# Patient Record
Sex: MALE | Race: WHITE | NOT HISPANIC OR LATINO | Employment: FULL TIME | URBAN - METROPOLITAN AREA
[De-identification: names, ages, dates, MRNs, and addresses within clinical notes are randomized per-mention and may not be internally consistent; named-entity substitution may affect disease eponyms.]

---
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
## 2018-08-14 ENCOUNTER — TELEPHONE (OUTPATIENT)
Dept: FAMILY MEDICINE | Facility: CLINIC | Age: 18
End: 2018-08-14

## 2018-08-14 ENCOUNTER — OFFICE VISIT (OUTPATIENT)
Dept: FAMILY MEDICINE | Facility: CLINIC | Age: 18
End: 2018-08-14
Payer: COMMERCIAL

## 2018-08-14 VITALS
DIASTOLIC BLOOD PRESSURE: 68 MMHG | HEART RATE: 75 BPM | SYSTOLIC BLOOD PRESSURE: 114 MMHG | WEIGHT: 158 LBS | TEMPERATURE: 97.6 F | BODY MASS INDEX: 21.4 KG/M2 | HEIGHT: 72 IN

## 2018-08-14 DIAGNOSIS — M54.59 MECHANICAL LOW BACK PAIN: Primary | ICD-10-CM

## 2018-08-14 PROCEDURE — 99213 OFFICE O/P EST LOW 20 MIN: CPT | Performed by: PHYSICIAN ASSISTANT

## 2018-08-14 ASSESSMENT — PAIN SCALES - GENERAL: PAINLEVEL: MILD PAIN (3)

## 2018-08-14 NOTE — TELEPHONE ENCOUNTER
Patient was seen by Ron today and referred to Chiropractor. Chiropractor office asking for xray report from last year be sent to them. Please call mom back and she will have info on where to send report to.

## 2018-08-14 NOTE — MR AVS SNAPSHOT
After Visit Summary   8/14/2018    Chris Bartholomew    MRN: [id number]           Patient Information     Date Of Birth          2000        Visit Information        Provider Department      8/14/2018 8:20 AM Ron Richardson PA-C [facility]        Today's Diagnoses     Mechanical low back pain    -  1       Follow-ups after your visit        Additional Services     CHIROPRACTIC REFERRAL       Your provider has referred you to: [facility]ropractor   Brian Guallpa. [phone]    Please be aware that coverage of these services is subject to the terms and limitations of your health insurance plan.  Call member services at your health plan with any benefit or coverage questions.      Please bring the following to your appointment:    >>   Any x-rays, CTs or MRIs which have been performed.  Contact the facility where they were done to arrange for  prior to your scheduled appointment.    >>   List of current medications   >>   This referral request   >>   Any documents/labs given to you for this referral                  Your next 10 appointments already scheduled     Aug 23, 2018  8:00 AM CDT   [facility]    [address] [facility] [address] [phone]              Who to contact     Normal or non-critical lab and imaging results will be communicated to you by MyChart, letter or phone within 4 business days after the clinic has received the results. If you do not hear from us within 7 days, please contact the clinic through MyChart or phone. If you have a critical or abnormal lab result, we will notify you by phone as soon as possible.  Submit refill requests through [employer] or call your pharmacy and they will forward the refill request to us. Please allow 3 business days for your refill to be completed.          If you need to speak with a  for additional information ,  "please call: [phone]             Additional Information About Your Visit        [employer] Information     [employer] lets you send messages to your doctor, view your test results, renew your prescriptions, schedule appointments and more. To sign up, go to [email or web address] . Click on \"Log in\" on the left side of the screen, which will take you to the Welcome page. Then click on \"Sign up Now\" on the right side of the page.     You will be asked to enter the access code listed below, as well as some personal information. Please follow the directions to create your username and password.     Your access code is: [email or web address]  Expires: 2018  8:59 AM     Your access code will  in 90 days. If you need help or a new code, please call your [facility] clinic or [phone].        Care EveryWhere ID     This is your Care EveryWhere ID. This could be used by other organizations to access your [facility] medical records  [id number]        Your Vitals Were     Pulse Temperature Height BMI (Body Mass Index)          75 97.6  F (36.4  C) (Tympanic) 5' 11.5\" (1.816 m) 21.73 kg/m2         Blood Pressure from Last 3 Encounters:   18 114/68   17 133/82   12 153/84    Weight from Last 3 Encounters:   18 158 lb (71.7 kg)   17 180 lb (81.6 kg) (83 %)*   12 157 lb 6.4 oz (71.4 kg) (91 %)*     * Growth percentiles are based on CDC 2-20 Years data.              We Performed the Following     CHIROPRACTIC REFERRAL        Primary Care Provider Office Phone # Fax #    Ron Richardson PA-C [phone]       [address] [place] [address]        Equal Access to Services     [facility]STEL : Constantine warren Soalyssa, waaxda luqadaha, qaybta kaalemeterio cullen, musa hummel. So [facility] [phone].    ATENCIÓN: Si habla español, tiene a gallegos disposición servicios gratuitos de asistencia lingüística. Llame al [phone].    We comply with " applicable federal civil rights laws and [place] laws. We do not discriminate on the basis of race, color, national origin, age, disability, sex, sexual orientation, or gender identity.            Thank you!     Thank you for choosing [facility]  for your care. Our goal is always to provide you with excellent care. Hearing back from our patients is one way we can continue to improve our services. Please take a few minutes to complete the written survey that you may receive in the mail after your visit with us. Thank you!             Your Updated Medication List - Protect others around you: Learn how to safely use, store and throw away your medicines at www.disposemymeds.org.          This list is accurate as of 8/14/18  9:00 AM.  Always use your most recent med list.                   Brand Name Dispense Instructions for use Diagnosis    albuterol (2.5 MG/3ML) 0.083% neb solution     30 vial    Take 3 mLs by nebulization every 6 hours as needed for shortness of breath / dyspnea.    Mild persistent asthma with exacerbation

## 2018-08-14 NOTE — PROGRESS NOTES
SUBJECTIVE:   Chris Bartholomew is a 18 year old male who presents to clinic today for the following health issues:    Chief Complaint   Patient presents with     Back Pain     **He is still having ongoing back pain since his last visit in June. He says it is getting a little bit better but is still there, and not going away.     **He was doing Physical Therapy regularly, and now he has been doing it inconsistently.      No radicular pain or paresthesias.     Problem list and histories reviewed & adjusted, as indicated.  Additional history: as documented    Patient Active Problem List   Diagnosis     Mild persistent asthma     History reviewed. No pertinent surgical history.    Social History   Substance Use Topics     Smoking status: Never Smoker     Smokeless tobacco: Never Used      Comment: Lives in smoke free household     Alcohol use No     Family History   Problem Relation Age of Onset     Unknown/Adopted Brother      half brother     Unknown/Adopted Brother      half brother     Allergies Mother      Parkinsonism Mother 54     Prostate Cancer Father      Cancer Father      prostate     Allergies Maternal Grandmother      Arthritis Maternal Grandmother      Gynecology Maternal Grandmother      Parkinsonism Maternal Grandmother      PSP     Allergies Maternal Grandfather      Cancer Maternal Grandfather      throat     Cancer - colorectal Paternal Grandmother      Respiratory Paternal Grandfather      Allergies Brother      Glaucoma Other      Hypertension Other      Diabetes Other      Glaucoma Other      Parkinsonism Other      Parkinsonism Other      Macular Degeneration No family hx of      Cerebrovascular Disease No family hx of      Thyroid Disease No family hx of          Current Outpatient Prescriptions   Medication Sig Dispense Refill     albuterol (2.5 MG/3ML) 0.083% nebulizer solution Take 3 mLs by nebulization every 6 hours as needed for shortness of breath / dyspnea. (Patient not taking: Reported  on 8/14/2018) 30 vial 0     Allergies   Allergen Reactions     Amoxicillin      Septra [Bactrim]      No lab results found.   BP Readings from Last 3 Encounters:   08/14/18 114/68   06/27/17 133/82   12/09/12 153/84    Wt Readings from Last 3 Encounters:   08/14/18 158 lb (71.7 kg)   06/27/17 180 lb (81.6 kg) (83 %)*   12/09/12 157 lb 6.4 oz (71.4 kg) (91 %)*     * Growth percentiles are based on CDC 2-20 Years data.                  Labs reviewed in EPIC    Reviewed and updated as needed this visit by clinical staff  Tobacco  Allergies  Meds  Med Hx  Surg Hx  Fam Hx  Soc Hx      Reviewed and updated as needed this visit by Provider         All other systems negative except as outline above  OBJECTIVE:  BACK: Lumbosacral spine area reveals local tenderness.  Painful and reduced LS ROM noted. Straight leg raise is negative.  DTR's, motor strength and sensation normal, including heel and toe gait.  Perifpheral pulses are palpable.  Hipes and knees have full range of motion without pain.  No abdominal tenderness, mass or organomegaly.    Chris was seen today for back pain.    Diagnoses and all orders for this visit:    Mechanical low back pain  -     CHIROPRACTIC REFERRAL      Advised supportive and symptomatic treatment.  Follow up with Provider - if condition persists or worsens.

## 2018-08-23 ENCOUNTER — OFFICE VISIT (OUTPATIENT)
Dept: OPTOMETRY | Facility: CLINIC | Age: 18
End: 2018-08-23
Payer: COMMERCIAL

## 2018-08-23 DIAGNOSIS — H52.13 MYOPIA OF BOTH EYES: Primary | ICD-10-CM

## 2018-08-23 PROCEDURE — 92014 COMPRE OPH EXAM EST PT 1/>: CPT | Performed by: OPTOMETRIST

## 2018-08-23 PROCEDURE — 92015 DETERMINE REFRACTIVE STATE: CPT | Performed by: OPTOMETRIST

## 2018-08-23 ASSESSMENT — REFRACTION_MANIFEST
OS_CYLINDER: +0.25
METHOD_AUTOREFRACTION: 1
OS_SPHERE: -4.50
OD_SPHERE: -4.25
OD_CYLINDER: SPHERE
OS_SPHERE: -4.50
OS_AXIS: 088
OD_SPHERE: -4.00
OS_CYLINDER: SPHERE

## 2018-08-23 ASSESSMENT — VISUAL ACUITY
METHOD: SNELLEN - LINEAR
OS_CC: 20/20
OD_CC: 20/20
CORRECTION_TYPE: GLASSES
OD_CC: 20/20
OS_CC: 20/20

## 2018-08-23 ASSESSMENT — REFRACTION_WEARINGRX
SPECS_TYPE: SVL
OD_SPHERE: -4.50
OS_CYLINDER: SPHERE
OD_CYLINDER: SPHERE
OS_SPHERE: -4.50

## 2018-08-23 ASSESSMENT — KERATOMETRY
OD_AXISANGLE2_DEGREES: 10
OS_AXISANGLE2_DEGREES: 2
OS_K2POWER_DIOPTERS: 43.50
OD_K1POWER_DIOPTERS: 42.50
OS_K1POWER_DIOPTERS: 42.75
OD_K2POWER_DIOPTERS: 43.25

## 2018-08-23 ASSESSMENT — CONF VISUAL FIELD
OD_NORMAL: 1
OS_NORMAL: 1
METHOD: COUNTING FINGERS

## 2018-08-23 ASSESSMENT — TONOMETRY
OS_IOP_MMHG: 16
IOP_METHOD: APPLANATION
OD_IOP_MMHG: 16

## 2018-08-23 NOTE — PROGRESS NOTES
Chief Complaint   Patient presents with     COMPREHENSIVE EYE EXAM     yearly      Accompanied by mother, also having an exam today   Last Eye Exam: 8/22/17  Dilated Previously: Yes    What are you currently using to see?  Glasses, wears the glasses all of the time        Distance Vision Acuity: Satisfied with vision, no changes     Near Vision Acuity: Satisfied with vision while reading and using computer with glasses    Eye Comfort: good  Do you use eye drops? 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       OBJECTIVE: See Ophthalmology exam    ASSESSMENT:    ICD-10-CM    1. Myopia of both eyes H52.13 EYE EXAM (SIMPLE-NONBILLABLE)     REFRACTION      PLAN:     Patient Instructions   Patient was advised of today's exam findings.  Fill glasses prescription  Return in 1 year for eye exam    Neda Hamilton O.D.  [facility]   [address] [place], [place] [address] [phone]

## 2018-08-23 NOTE — PATIENT INSTRUCTIONS
Patient was advised of today's exam findings.  Fill glasses prescription  Return in 1 year for eye exam    Neda Hamilton O.D.  [facility]   [address] [place], [place] [address] [phone]

## 2018-08-23 NOTE — MR AVS SNAPSHOT
"              After Visit Summary   8/23/2018    Chris Bartholomew    MRN: [id number]           Patient Information     Date Of Birth          2000        Visit Information        Provider Department      8/23/2018 8:00 AM Neda Hamilton, [facility]        Today's Diagnoses     Myopia of both eyes    -  1      Care Instructions    Patient was advised of today's exam findings.  Fill glasses prescription  Return in 1 year for eye exam    Neda Hamilton O.D.  [facility]   [address] [place] [address]  [phone]          Follow-ups after your visit        Who to contact     If you have questions or need follow up information about today's clinic visit or your schedule please contact [facility] directly at [phone].  Normal or non-critical lab and imaging results will be communicated to you by MyChart, letter or phone within 4 business days after the clinic has received the results. If you do not hear from us within 7 days, please contact the clinic through MyChart or phone. If you have a critical or abnormal lab result, we will notify you by phone as soon as possible.  Submit refill requests through [employer] or call your pharmacy and they will forward the refill request to us. Please allow 3 business days for your refill to be completed.          Additional Information About Your Visit        MyChart Information     [employer] lets you send messages to your doctor, view your test results, renew your prescriptions, schedule appointments and more. To sign up, go to [email or web address] . Click on \"Log in\" on the left side of the screen, which will take you to the Welcome page. Then click on \"Sign up Now\" on the right side of the page.     You will be asked to enter the access code listed below, as well as some personal information. Please follow the directions to create your username and password.     Your access code is: [email or web address]  Expires: 11/12/2018  " 9:13 AM     Your access code will  in 90 days. If you need help or a new code, please call your [facility] clinic or [phone].        Care EveryWhere ID     This is your Care EveryWhere ID. This could be used by other organizations to access your [facility] medical records  [id number]         Blood Pressure from Last 3 Encounters:   18 114/68   17 133/82   12 153/84    Weight from Last 3 Encounters:   18 71.7 kg (158 lb)   17 81.6 kg (180 lb) (83 %)*   12 71.4 kg (157 lb 6.4 oz) (91 %)*     * Growth percentiles are based on [facility] 2-20 Years data.              We Performed the Following     EYE EXAM (SIMPLE-NONBILLABLE)     REFRACTION        Primary Care Provider Office Phone # Fax #    Ron Richardson PA-C [phone]       [address]  [place] [address]        Equal Access to Services     [facility]: Hadii aad ku hadasho Soomaali, waaxda luqadaha, qaybta kaalmada adeegyada, waxay andi haythao jain . So [facility] [phone].    ATENCIÓN: Si habla español, tiene a gallegos disposición servicios gratuitos de asistencia lingüística. Llame al [phone].    We comply with applicable federal civil rights laws and [place] laws. We do not discriminate on the basis of race, color, national origin, age, disability, sex, sexual orientation, or gender identity.            Thank you!     Thank you for choosing [facility]  for your care. Our goal is always to provide you with excellent care. Hearing back from our patients is one way we can continue to improve our services. Please take a few minutes to complete the written survey that you may receive in the mail after your visit with us. Thank you!             Your Updated Medication List - Protect others around you: Learn how to safely use, store and throw away your medicines at www.disposemymeds.org.          This list is accurate as of 18  9:31 AM.  Always use your most recent  med list.                   Brand Name Dispense Instructions for use Diagnosis    albuterol (2.5 MG/3ML) 0.083% neb solution     30 vial    Take 3 mLs by nebulization every 6 hours as needed for shortness of breath / dyspnea.    Mild persistent asthma with exacerbation

## 2018-08-23 NOTE — LETTER
8/23/2018         RE: Chris Bartholomew  [id number]  [place] [address]        Dear Colleague,    Thank you for referring your patient, Chris Bartholomew, to [facility]. Please see a copy of my visit note below.    Chief Complaint   Patient presents with     COMPREHENSIVE EYE EXAM     yearly      Accompanied by mother, also having an exam today   Last Eye Exam: 8/22/17  Dilated Previously: Yes    What are you currently using to see?  Glasses, wears the glasses all of the time        Distance Vision Acuity: Satisfied with vision, no changes     Near Vision Acuity: Satisfied with vision while reading and using computer with glasses    Eye Comfort: good  Do you use eye drops? : No  Occupation or Hobbies: Jr in college, -[employer] engineering major    Emily Apple Optometric Assistant           Medical, surgical and family histories reviewed and updated 8/23/2018.       OBJECTIVE: See Ophthalmology exam    ASSESSMENT:    ICD-10-CM    1. Myopia of both eyes H52.13 EYE EXAM (SIMPLE-NONBILLABLE)     REFRACTION      PLAN:     Patient Instructions   Patient was advised of today's exam findings.  Fill glasses prescription  Return in 1 year for eye exam    Neda Hamilton O.D.  [facility]   [address] [place], [place] [id number] [phone]         Again, thank you for allowing me to participate in the care of your patient.        Sincerely,        Neda Hamilton, OD

## 2018-08-25 ASSESSMENT — CUP TO DISC RATIO
OS_RATIO: 0.4
OD_RATIO: 0.4

## 2018-08-25 ASSESSMENT — SLIT LAMP EXAM - LIDS: COMMENTS: NORMAL

## 2018-08-25 ASSESSMENT — EXTERNAL EXAM - LEFT EYE: OS_EXAM: NORMAL

## 2018-08-25 ASSESSMENT — EXTERNAL EXAM - RIGHT EYE: OD_EXAM: NORMAL

## 2019-08-26 ENCOUNTER — OFFICE VISIT (OUTPATIENT)
Dept: OPTOMETRY | Facility: CLINIC | Age: 19
End: 2019-08-26
Payer: COMMERCIAL

## 2019-08-26 DIAGNOSIS — H52.13 MYOPIA OF BOTH EYES: Primary | ICD-10-CM

## 2019-08-26 DIAGNOSIS — H52.223 REGULAR ASTIGMATISM OF BOTH EYES: ICD-10-CM

## 2019-08-26 PROCEDURE — 92015 DETERMINE REFRACTIVE STATE: CPT | Performed by: OPTOMETRIST

## 2019-08-26 PROCEDURE — 92014 COMPRE OPH EXAM EST PT 1/>: CPT | Performed by: OPTOMETRIST

## 2019-08-26 ASSESSMENT — KERATOMETRY
OD_AXISANGLE2_DEGREES: 6
OD_K1POWER_DIOPTERS: 42.50
OS_K2POWER_DIOPTERS: 43.75
OS_AXISANGLE2_DEGREES: 3
OD_K2POWER_DIOPTERS: 43.50
OS_K1POWER_DIOPTERS: 42.75

## 2019-08-26 ASSESSMENT — TONOMETRY
OD_IOP_MMHG: 12
OS_IOP_MMHG: 12
IOP_METHOD: APPLANATION

## 2019-08-26 ASSESSMENT — REFRACTION_MANIFEST
OD_AXIS: 105
OS_SPHERE: -4.50
OD_CYLINDER: +0.25
OS_CYLINDER: +0.50
OD_SPHERE: -4.50
METHOD_AUTOREFRACTION: 1
OD_CYLINDER: +0.50
OS_CYLINDER: +0.50
OD_SPHERE: -4.50
OS_AXIS: 090
OD_AXIS: 107
OS_SPHERE: -4.50
OS_AXIS: 094

## 2019-08-26 ASSESSMENT — REFRACTION_WEARINGRX
OS_CYLINDER: SPHERE
OD_SPHERE: -4.50
SPECS_TYPE: SVL
OD_CYLINDER: SPHERE
OS_SPHERE: -4.50

## 2019-08-26 ASSESSMENT — VISUAL ACUITY
OS_CC: 20/20
OD_CC: 20/20
OS_CC: 20/20
OD_CC: 20/20
CORRECTION_TYPE: GLASSES
METHOD: SNELLEN - LINEAR

## 2019-08-26 ASSESSMENT — EXTERNAL EXAM - LEFT EYE: OS_EXAM: NORMAL

## 2019-08-26 ASSESSMENT — CONF VISUAL FIELD
OD_NORMAL: 1
OS_NORMAL: 1
METHOD: COUNTING FINGERS

## 2019-08-26 ASSESSMENT — CUP TO DISC RATIO
OD_RATIO: 0.4
OS_RATIO: 0.4

## 2019-08-26 ASSESSMENT — EXTERNAL EXAM - RIGHT EYE: OD_EXAM: NORMAL

## 2019-08-26 ASSESSMENT — SLIT LAMP EXAM - LIDS: COMMENTS: NORMAL

## 2019-08-26 NOTE — PATIENT INSTRUCTIONS
Patient was advised of today's exam findings.  Fill glasses prescription  Return in 1 year for eye exam      Neda Hamilton O.D.  [facility]   [address] [place], [place] [id number] [phone]

## 2020-12-20 ENCOUNTER — OFFICE VISIT (OUTPATIENT)
Dept: URGENT CARE | Facility: URGENT CARE | Age: 20
End: 2020-12-20
Payer: COMMERCIAL

## 2020-12-20 VITALS
OXYGEN SATURATION: 100 % | WEIGHT: 170 LBS | DIASTOLIC BLOOD PRESSURE: 80 MMHG | RESPIRATION RATE: 16 BRPM | TEMPERATURE: 98.5 F | BODY MASS INDEX: 23.38 KG/M2 | SYSTOLIC BLOOD PRESSURE: 182 MMHG | HEART RATE: 88 BPM

## 2020-12-20 DIAGNOSIS — M26.621 TMJ TENDERNESS, RIGHT: Primary | ICD-10-CM

## 2020-12-20 PROCEDURE — 99213 OFFICE O/P EST LOW 20 MIN: CPT | Performed by: NURSE PRACTITIONER

## 2020-12-20 NOTE — PROGRESS NOTES
SUBJECTIVE:   Chris Bartholomew is a 20 year old male presenting with a chief complaint of jaw pain right upper side by ear (TMJ joint)  Onset of symptoms was 1 week(s) ago.  Course of illness is worsening.    Severity moderate  Current and Associated symptoms:painful to chew,  locking  Has been clenching at night he believes, increased stress at work. No tooth issues or fever  Treatment measures tried include Tylenol/Ibuprofen.ice heat  Predisposing factors include None.    Past Medical History:   Diagnosis Date     Asthma      Nevus     left sole of foot 2 x4 nevus     Current Outpatient Medications   Medication Sig Dispense Refill     albuterol (2.5 MG/3ML) 0.083% nebulizer solution Take 3 mLs by nebulization every 6 hours as needed for shortness of breath / dyspnea. (Patient not taking: Reported on 8/14/2018) 30 vial 0     Social History     Tobacco Use     Smoking status: Never Smoker     Smokeless tobacco: Never Used     Tobacco comment: Lives in smoke free household   Substance Use Topics     Alcohol use: No       ROS:  Review of systems negative except as stated above.    OBJECTIVE:  BP (!) 182/80   Pulse 88   Temp 98.5  F (36.9  C) (Oral)   Resp 16   Wt 77.1 kg (170 lb)   SpO2 100%   BMI 23.38 kg/m    GENERAL APPEARANCE:  alert and no distress  EYES: EOMI,  PERRL, conjunctiva clear  HENT: ear canals and TM's normal.  Nose and mouth without ulcers, erythema or lesions. Tenderness at right TMJ joint, clicking  NECK: supple, nontender, no lymphadenopathy  RESP: lungs clear to auscultation - no rales, rhonchi or wheezes  CV: regular rates and rhythm, normal S1 S2, no murmur noted  NEURO: Normal strength and tone, sensory exam grossly normal,  normal speech and mentation  SKIN: no suspicious lesions or rashes    ASSESSMENT:  (M26.621) TMJ tenderness, right  (primary encounter diagnosis)    Plan: OTOLARYNGOLOGY REFERRAL  Will do soft diet bland foods ibuprofen ice  Patient education given  Home treat and  monitor symptoms call or rtc as needed      ERIK Grimaldo CNP

## 2020-12-20 NOTE — PATIENT INSTRUCTIONS
Patient Education     Pain Relief Methods for Temporomandibular Disorders (TMD)  You have been diagnosed with temporomandibular disorder (TMD). This term describes a group of problems related to the temporomandibular joint (TMJ) and nearby muscles. The TMJ is located where the upper and lower jaws meet. TMD can cause painful and frustrating symptoms. But your healthcare provider can recommend various pain relief methods as part of your treatment. These may include medicines and certain types of therapy, such as massage or gentle exercise.  Using medicines    Medicines may be prescribed to treat TMD. Others may be available over the counter. The medicine type and dosage will depend on the problem you have. For your safety, tell your healthcare provider if you are currently taking any medicines. Also mention any vitamins, herbs, or supplements you are using. Common medicines used to treat TMD include:    Anti-inflammatories and analgesics. These treat pain, inflammation, osteoarthritis, and rheumatoid arthritis. Anti-inflammatories reduce swelling, heat, redness, and pain. They also help restore function. Analgesics reduce pain. Nonsteroidal anti-inflammatories (NSAIDs) relieve inflammation as well as pain.    Muscle relaxants. These treat myofascial pain. This is pain that occurs in the soft tissues or muscles around the TMJ. Muscle relaxants help ease muscle tension. This reduces pressure on the TMJ from tight jaw muscles.    Antidepressants. These can be used to reduce pain or teeth grinding (bruxism). At higher dosages, these medicines are used to treat depression. Given at low dosages, antidepressants help relieve TMD symptoms. They can reduce muscle pain. They also raise the level of serotonin, a body chemical that improves sleep. This in turn can decrease bruxism during the night.  Treating painful muscles  A trigger point is a painful spot in a tight muscle. It is often painful to the touch and may refer  pain to other places. Your healthcare provider can focus on trigger points using:    Massage, both inside and outside the mouth. This relaxes muscles and improves circulation.    Palpation, which is applying pressure to points of the jaw and face with the fingers.    Cold spray and stretching of the muscles to relax them.    An anesthetic for pain relief. This may be given as an injection by your dentist.  Treating the joint  Therapy may focus directly on the TMJ. There are different ways to treat the joint:    A self-care program helps you treat and manage symptoms on your own. Your program may include exercises. It may also include using ice and heat to relieve pain.    Gentle manipulation reduces pain and restores range of motion. The healthcare provider uses his or her hands to relax muscles and ligaments around the joint.    Exercises strengthen muscles in the jaw and face.    Ultrasound uses sound waves to reduce pain and swelling. It also improves pain and swelling.  Treating inflammation  When the joint is inflamed, movement becomes difficult. It is even impossible at times. Your healthcare provider can help. Treatment may include:    Rest and gentle exercise. This is done to increase range of motion. One common exercise is to apply pressure to the jaw and resist the movement (isometric exercise).    A cold pack. This eases swelling and reduces pain. A cold pack may be applied for 10 to 20 minutes. Repeat 3 or 4 times a day. To make a cold pack, put ice cubes in a plastic bag that seals at the top. Wrap the bag in a clean, thin towel or cloth. Never put ice or a cold pack directly on the skin.    Massage and gentle manipulation.  As described above.     [employer] last reviewed this educational content on 8/1/2017 2000-2020 [employer]. [address] [place] [address], [place] [address]. All rights reserved. This information is not intended as a substitute for professional medical care. Always follow  your healthcare professional's instructions.

## 2020-12-23 ENCOUNTER — OFFICE VISIT (OUTPATIENT)
Dept: OTOLARYNGOLOGY | Facility: CLINIC | Age: 20
End: 2020-12-23
Payer: COMMERCIAL

## 2020-12-23 VITALS
HEART RATE: 90 BPM | DIASTOLIC BLOOD PRESSURE: 82 MMHG | WEIGHT: 170 LBS | RESPIRATION RATE: 16 BRPM | BODY MASS INDEX: 23.38 KG/M2 | OXYGEN SATURATION: 98 % | SYSTOLIC BLOOD PRESSURE: 141 MMHG

## 2020-12-23 DIAGNOSIS — R68.84 JAW PAIN: Primary | ICD-10-CM

## 2020-12-23 DIAGNOSIS — H92.01 OTALGIA, RIGHT: ICD-10-CM

## 2020-12-23 PROCEDURE — 99244 OFF/OP CNSLTJ NEW/EST MOD 40: CPT | Performed by: OTOLARYNGOLOGY

## 2020-12-23 RX ORDER — CYCLOBENZAPRINE HCL 5 MG
5-10 TABLET ORAL DAILY
Qty: 30 TABLET | Refills: 1 | Status: SHIPPED | OUTPATIENT
Start: 2020-12-23 | End: 2022-03-29

## 2020-12-23 ASSESSMENT — PAIN SCALES - GENERAL: PAINLEVEL: NO PAIN (0)

## 2020-12-23 NOTE — PROGRESS NOTES
I am seeing this patient in consultation for right TMJ pain at the request of the provider Bhavna Pugh.    Chief Complaint - jaw pain    History of Present Illness - Chris Bartholomew is a 20 year old male who presents with the new onset of ear pain. The patient describes this as pain in front of the right ear. He thinks he clenches jaw at work. Has had more stress. Then also has gotten some lock jaw on the right. He feels some malalignment of the teeth. This all started 2 weeks ago. He has used hot packs and massage. It helps, but then still goes back. He feels jaw is better in morning.     Past Medical History -   Patient Active Problem List   Diagnosis     Mild persistent asthma       Current Medications -   Current Outpatient Medications:      albuterol (2.5 MG/3ML) 0.083% nebulizer solution, Take 3 mLs by nebulization every 6 hours as needed for shortness of breath / dyspnea. (Patient not taking: Reported on 8/14/2018), Disp: 30 vial, Rfl: 0    Allergies -   Allergies   Allergen Reactions     Amoxicillin      Septra [Bactrim]        Social History -   Social History     Socioeconomic History     Marital status: Single     Spouse name: Not on file     Number of children: Not on file     Years of education: Not on file     Highest education level: Not on file   Occupational History     Not on file   Social Needs     Financial resource strain: Not on file     Food insecurity     Worry: Not on file     Inability: Not on file     Transportation needs     Medical: Not on file     Non-medical: Not on file   Tobacco Use     Smoking status: Never Smoker     Smokeless tobacco: Never Used     Tobacco comment: Lives in smoke free household   Substance and Sexual Activity     Alcohol use: No     Drug use: No     Sexual activity: Never   Lifestyle     Physical activity     Days per week: Not on file     Minutes per session: Not on file     Stress: Not on file   Relationships     Social connections     Talks on phone: Not on  file     Gets together: Not on file     Attends Confucianist service: Not on file     Active member of club or organization: Not on file     Attends meetings of clubs or organizations: Not on file     Relationship status: Not on file     Intimate partner violence     Fear of current or ex partner: Not on file     Emotionally abused: Not on file     Physically abused: Not on file     Forced sexual activity: Not on file   Other Topics Concern     Not on file   Social History Narrative     Not on file       Family History -   Family History   Problem Relation Age of Onset     Unknown/Adopted Brother         half brother     Unknown/Adopted Brother         half brother     Allergies Mother      Parkinsonism Mother 54     Prostate Cancer Father      Cancer Father         prostate     Allergies Maternal Grandmother      Arthritis Maternal Grandmother      Gynecology Maternal Grandmother      Parkinsonism Maternal Grandmother         PSP     Allergies Maternal Grandfather      Cancer Maternal Grandfather         throat     Cancer - colorectal Paternal Grandmother      Respiratory Paternal Grandfather      Allergies Brother      Glaucoma Other      Hypertension Other      Diabetes Other      Glaucoma Other      Parkinsonism Other      Parkinsonism Other      Macular Degeneration No family hx of      Cerebrovascular Disease No family hx of      Thyroid Disease No family hx of      Review of Systems - As per HPI and PMHx, otherwise 10+ comprehensive system review is negative.      Physical Exam  BP (!) 141/82   Pulse 90   Resp 16   Wt 77.1 kg (170 lb)   SpO2 98%   BMI 23.38 kg/m    General - The patient is in no distress.  Alert and oriented to person and place, answers questions and cooperates with examination appropriately.   Neurologic - CN II-XII are grossly intact, no focal neurologic deficits. HB 1 out of 6 bilaterally.  Voice and Breathing - The patient was breathing comfortably without the use of accessory muscles.  There was no wheezing, stridor, or stertor.  The patients voice was clear and strong.  Eyes - Extraocular movements intact. Sclera were not icteric or injected, conjunctiva were pink and moist.  Ears - The tympanic membranes are normal in appearance, bony landmarks are intact.  No retraction, perforation, or masses. No fluid or purulence was seen in the external canal or the middle ear. No evidence of infection of the middle ear or external canal, cerumen was normal in appearance.  Mouth - pain of right TMJ area. Dentition in fair condition, no masses, ulcerations, or erosions noted on examination of mucosa. The tongue is mobile and midline, and the uvula is midline on elevation. The dental exam was notable for wear facets that were consistent with bruxism.  Occlusion appears normal. No trismus. He has clicking of the TMJ with movement of jaw.   Throat - The walls of the oropharynx were smooth, symmetric, and had no lesions or ulcerations. The uvula was midline on elevation.    Neck - Palpation of the occipital, submental, submandibular, internal jugular chain, and supraclavicular nodes did not demonstrate any abnormal lymph nodes or masses. Palpation of the thyroid was soft and smooth, with no nodules or goiter appreciated.  The trachea was mobile and midline.  Cardiovascular - carotid pulses are 2+ bilaterally, regular rhythm        A/P - Chris Bartholomew is a 20 year old male who presents with right jaw pain.  He is also had some trismus at times.  That was not apparent in clinic today.  This seems like TMJ.  I recommend hot packs, massage, soft diet, and Flexeril.  I also wrote for jaw physical therapy.  He was very worried about this and the changes in the jaw position.  Therefore if he does not improve over the next few days I did order a TMJ and neck CT scan to look for other pathology.  We will call with results if he undergoes the scans.    Brennon Cespedes MD  Otolaryngology  [facility]

## 2020-12-23 NOTE — LETTER
12/23/2020         RE: Chris Bartholomew  [id number]  [facility]  [place] [address]        Dear Colleague,    Thank you for referring your patient, Chris Bartholomew, to the [facility]. Please see a copy of my visit note below.    I am seeing this patient in consultation for right TMJ pain at the request of the provider Bhavna Pugh.    Chief Complaint - jaw pain    History of Present Illness - Chris Bartholomew is a 20 year old male who presents with the new onset of ear pain. The patient describes this as pain in front of the right ear. He thinks he clenches jaw at work. Has had more stress. Then also has gotten some lock jaw on the right. He feels some malalignment of the teeth. This all started 2 weeks ago. He has used hot packs and massage. It helps, but then still goes back. He feels jaw is better in morning.     Past Medical History -   Patient Active Problem List   Diagnosis     Mild persistent asthma       Current Medications -   Current Outpatient Medications:      albuterol (2.5 MG/3ML) 0.083% nebulizer solution, Take 3 mLs by nebulization every 6 hours as needed for shortness of breath / dyspnea. (Patient not taking: Reported on 8/14/2018), Disp: 30 vial, Rfl: 0    Allergies -   Allergies   Allergen Reactions     Amoxicillin      Septra [Bactrim]        Social History -   Social History     Socioeconomic History     Marital status: Single     Spouse name: Not on file     Number of children: Not on file     Years of education: Not on file     Highest education level: Not on file   Occupational History     Not on file   Social Needs     Financial resource strain: Not on file     Food insecurity     Worry: Not on file     Inability: Not on file     Transportation needs     Medical: Not on file     Non-medical: Not on file   Tobacco Use     Smoking status: Never Smoker     Smokeless tobacco: Never Used     Tobacco comment: Lives in smoke free household   Substance and Sexual  Activity     Alcohol use: No     Drug use: No     Sexual activity: Never   Lifestyle     Physical activity     Days per week: Not on file     Minutes per session: Not on file     Stress: Not on file   Relationships     Social connections     Talks on phone: Not on file     Gets together: Not on file     Attends Gnosticist service: Not on file     Active member of club or organization: Not on file     Attends meetings of clubs or organizations: Not on file     Relationship status: Not on file     Intimate partner violence     Fear of current or ex partner: Not on file     Emotionally abused: Not on file     Physically abused: Not on file     Forced sexual activity: Not on file   Other Topics Concern     Not on file   Social History Narrative     Not on file       Family History -   Family History   Problem Relation Age of Onset     Unknown/Adopted Brother         half brother     Unknown/Adopted Brother         half brother     Allergies Mother      Parkinsonism Mother 54     Prostate Cancer Father      Cancer Father         prostate     Allergies Maternal Grandmother      Arthritis Maternal Grandmother      Gynecology Maternal Grandmother      Parkinsonism Maternal Grandmother         PSP     Allergies Maternal Grandfather      Cancer Maternal Grandfather         throat     Cancer - colorectal Paternal Grandmother      Respiratory Paternal Grandfather      Allergies Brother      Glaucoma Other      Hypertension Other      Diabetes Other      Glaucoma Other      Parkinsonism Other      Parkinsonism Other      Macular Degeneration No family hx of      Cerebrovascular Disease No family hx of      Thyroid Disease No family hx of      Review of Systems - As per HPI and PMHx, otherwise 10+ comprehensive system review is negative.      Physical Exam  BP (!) 141/82   Pulse 90   Resp 16   Wt 77.1 kg (170 lb)   SpO2 98%   BMI 23.38 kg/m    General - The patient is in no distress.  Alert and oriented to person and place,  answers questions and cooperates with examination appropriately.   Neurologic - CN II-XII are grossly intact, no focal neurologic deficits. HB 1 out of 6 bilaterally.  Voice and Breathing - The patient was breathing comfortably without the use of accessory muscles. There was no wheezing, stridor, or stertor.  The patients voice was clear and strong.  Eyes - Extraocular movements intact. Sclera were not icteric or injected, conjunctiva were pink and moist.  Ears - The tympanic membranes are normal in appearance, bony landmarks are intact.  No retraction, perforation, or masses. No fluid or purulence was seen in the external canal or the middle ear. No evidence of infection of the middle ear or external canal, cerumen was normal in appearance.  Mouth - pain of right TMJ area. Dentition in fair condition, no masses, ulcerations, or erosions noted on examination of mucosa. The tongue is mobile and midline, and the uvula is midline on elevation. The dental exam was notable for wear facets that were consistent with bruxism.  Occlusion appears normal. No trismus. He has clicking of the TMJ with movement of jaw.   Throat - The walls of the oropharynx were smooth, symmetric, and had no lesions or ulcerations. The uvula was midline on elevation.    Neck - Palpation of the occipital, submental, submandibular, internal jugular chain, and supraclavicular nodes did not demonstrate any abnormal lymph nodes or masses. Palpation of the thyroid was soft and smooth, with no nodules or goiter appreciated.  The trachea was mobile and midline.  Cardiovascular - carotid pulses are 2+ bilaterally, regular rhythm        A/P - Chris Bartholomew is a 20 year old male who presents with right jaw pain.  He is also had some trismus at times.  That was not apparent in clinic today.  This seems like TMJ.  I recommend hot packs, massage, soft diet, and Flexeril.  I also wrote for jaw physical therapy.  He was very worried about this and the changes  in the jaw position.  Therefore if he does not improve over the next few days I did order a TMJ and neck CT scan to look for other pathology.  We will call with results if he undergoes the scans.    Brennon Cespedes MD  Otolaryngology  [facility]        Again, thank you for allowing me to participate in the care of your patient.        Sincerely,        Brennon Cespedes MD

## 2020-12-24 ENCOUNTER — HOSPITAL ENCOUNTER (OUTPATIENT)
Dept: CT IMAGING | Facility: CLINIC | Age: 20
Discharge: HOME | End: 2020-12-24
Attending: OTOLARYNGOLOGY | Admitting: OTOLARYNGOLOGY
Payer: COMMERCIAL

## 2020-12-24 DIAGNOSIS — R68.84 JAW PAIN: ICD-10-CM

## 2020-12-24 DIAGNOSIS — H92.01 OTALGIA, RIGHT: ICD-10-CM

## 2020-12-24 PROCEDURE — 250N000011 HC RX IP 250 OP 636: Performed by: OTOLARYNGOLOGY

## 2020-12-24 PROCEDURE — 70491 CT SOFT TISSUE NECK W/DYE: CPT

## 2020-12-24 RX ORDER — IOPAMIDOL 755 MG/ML
500 INJECTION, SOLUTION INTRAVASCULAR ONCE
Status: COMPLETED | OUTPATIENT
Start: 2020-12-24 | End: 2020-12-24

## 2020-12-24 RX ADMIN — IOPAMIDOL 80 ML: 755 INJECTION, SOLUTION INTRAVENOUS at 10:15

## 2020-12-28 ENCOUNTER — TELEPHONE (OUTPATIENT)
Dept: OTOLARYNGOLOGY | Facility: CLINIC | Age: 20
End: 2020-12-28

## 2020-12-28 NOTE — TELEPHONE ENCOUNTER
CT normal. This is likely TMJ. Patient is feeling better with muscle relaxant. Continue TMJ precautions.

## 2021-01-05 ENCOUNTER — THERAPY VISIT (OUTPATIENT)
Dept: PHYSICAL THERAPY | Facility: CLINIC | Age: 21
End: 2021-01-05
Attending: OTOLARYNGOLOGY
Payer: COMMERCIAL

## 2021-01-05 DIAGNOSIS — R68.84 JAW PAIN: ICD-10-CM

## 2021-01-05 DIAGNOSIS — M26.609 TMJ (TEMPOROMANDIBULAR JOINT SYNDROME): ICD-10-CM

## 2021-01-05 PROCEDURE — 97112 NEUROMUSCULAR REEDUCATION: CPT | Mod: GP | Performed by: PHYSICAL THERAPIST

## 2021-01-05 PROCEDURE — 97110 THERAPEUTIC EXERCISES: CPT | Mod: GP | Performed by: PHYSICAL THERAPIST

## 2021-01-05 PROCEDURE — 97162 PT EVAL MOD COMPLEX 30 MIN: CPT | Mod: GP | Performed by: PHYSICAL THERAPIST

## 2021-01-05 NOTE — PROGRESS NOTES
[facility] Physical Therapy Initial Evaluation  1/5/2021     Precautions/Restrictions/MD instructions: evaluate and treat TMJ    Therapist Assessment: Chris Bartholomew is a 20 year old male patient presenting to Physical Therapy with right TMD. Patient demonstrates decreased ROM, increased muscle tone and irritability. Signs and symptoms are consistent with subacute TMD - disc displacement with reduction and myofascial pain. These impairments limit their ability to eat, chew, talk, perform oral hygiene. Skilled PT services are necessary in order to reduce impairments and improve independent function.    SUBJECTIVE  Chief Complaint: pain in front of right ear  Associated symptoms: was stuck open, malalignment of teeth - now resolved. Can't open fully. Clicking in jaw.  Onset date: early December without particular incident, date of MD order 12/23/20  MUKESH: traumatic vs insidious - may be related to increased stress/clenching at work  Pain severity: 0/10 at rest; 0/10 current, 4/10 worst  Location of pain: right TMJ region, right in front of the ear  Quality of Pain: used to be constant, now intermittent; ache and sometimes sharp   Better: pain-free diet, mm relaxants, working on relaxing jaw, weekends  Worse: yawning, opening fully, hard foods (cutting out meats and other chewy foods), clenching at work, stress  Time of day: better in the morning, worse after a full day of work  Progression of Symptoms since onset:  Since onset, these symptoms are improving  Previous treatment: has included exercises from internet; effect was not very helpful, has since stopped  Current Functional Status: impaired  Previous Functional Status:  fully functional prior to pain onset/injury.  Outcome measure: JFLS average score: 3.67  Mastication subscore: 6 Mobility subscore: 5  Communication subscore: 0    TMJ specific questions: (Bold when present) - reviewed the following and denies   Smoking     Caffeine intake  0x/day   Daytime clenching/grinding  Nocturnal clenching/grinding   Tongue thrusting   Unilateral chewing - chews on left since pain began  Chewing nails, pens, etc.  Recent dental issues and/or procedures  RA or OA       General health as reported by patient: excellent.    PMH: asthma   Surgical history/trauma: None. He denies any significant current illness or recent hospital admissions. He denies any regional implanted devices.  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[employer], drafting on a computer  Current exercise regimen/Recreational activities: biking in summer, just got a  for home use  Barriers to treatment: none    Red Flags: (Bold when present) - reviewed the following and denies  Severe Temporal headache  Pain only during eating that stops immediately after  Numbness in orofacial area  Facial paralysis   Changes in peripheral vision  Dizziness, vertigo  Disturbances in smell/taste  Ear fullness, ringing, hearing loss    Patient's Goal(s): be able to open mouth all the way, go back to a full diet      OBJECTIVE    Cervical spine screen  WNL all directions except min loss cervical retraction with stretching on the right side of the neck    TMJ AROM:   Movement Mm Joint noise Deviation Pain   Opening 36 mm R click mid-point of opening S curve opening/closing R side on full opening   Left Lateral deviation  5 mm no  R side end range   Right Lateral Deviation 10 mm         Strength  Motion Static Dynamic   Opening 5/5    Closing 5/5    R laterotrusion 5/5    L laterotrusion 5/5    Protrusion NT      Special tests  Bite test: NT   Compression test: NT  Distraction: NT    Joint mobility - not tested today  Left: hyper/hypo/normal  Right: hyper/hypo/normal    Palpation - BOLD if tender to palpation  Right side tender: masseter, temporalis, lateral pterygoid, medial pterygoid, SCM, scalenes, suboccipitals,  upper trapezius, levator scapulae, TMJ capsule  Left side tender: masseter, temporalis, lateral pterygoid, medial pterygoid, SCM, scalenes, suboccipitals, upper trapezius, levator scapulae, TMJ capsule    No profound tenderness but palpable taut bands in the right temporalis and masseter        ASSESSMENT/PLAN  Patient is a 41 year old male with right side TMJ complaints.    Patient has the following significant findings with corresponding treatment plan.                Diagnosis 1:  subacute TMD - disc displacement with reduction    Pain -  manual therapy, education, directional preference exercise and home program  Decreased ROM/flexibility - manual therapy, therapeutic exercise and home program  Impaired muscle performance - neuro re-education and home program  Impaired posture - neuro re-education and home program    Therapy Evaluation Codes:   1) History comprised of:   Personal factors that impact the plan of care:      Anxiety and Overall behavior pattern.    Comorbidity factors that impact the plan of care are:      None.     Medications impacting care: Muscle relaxant.  2) Examination of Body Systems comprised of:   Body structures and functions that impact the plan of care:      TMJ.   Activity limitations that impact the plan of care are:      eating, chewing, performing oral hygiene, talking.  3) Clinical presentation characteristics are:   Evolving/Changing.  4) Decision-Making    Moderate complexity using standardized patient assessment instrument and/or measureable assessment of functional outcome.  Cumulative Therapy Evaluation is: Moderate complexity.    Previous and current functional limitations:  (See Goal Flow Sheet for this information)    Short term and Long term goals: (See Goal Flow Sheet for this information)     Communication ability:  Patient appears to be able to clearly communicate and understand verbal and written communication and follow directions correctly.  Treatment Explanation -  The following has been discussed with the patient:   RX ordered/plan of care  Anticipated outcomes  Possible risks and side effects  This patient would benefit from PT intervention to resume normal activities.   Rehab potential is excellent.    Frequency:  1 X week, once daily  Duration:  for 4-6 weeks  Discharge Plan:  Achieve all LTG.  Independent in home treatment program.  Reach maximal therapeutic benefit.    Please refer to the daily flowsheet for treatment today, total treatment time and time spent performing 1:1 timed codes.

## 2021-01-16 ENCOUNTER — THERAPY VISIT (OUTPATIENT)
Dept: PHYSICAL THERAPY | Facility: CLINIC | Age: 21
End: 2021-01-16
Payer: COMMERCIAL

## 2021-01-16 DIAGNOSIS — M26.609 TMJ (TEMPOROMANDIBULAR JOINT SYNDROME): ICD-10-CM

## 2021-01-16 PROCEDURE — 97140 MANUAL THERAPY 1/> REGIONS: CPT | Mod: GP | Performed by: PHYSICAL THERAPIST

## 2021-01-16 PROCEDURE — 97112 NEUROMUSCULAR REEDUCATION: CPT | Mod: GP | Performed by: PHYSICAL THERAPIST

## 2021-01-16 PROCEDURE — 97110 THERAPEUTIC EXERCISES: CPT | Mod: GP | Performed by: PHYSICAL THERAPIST

## 2021-01-16 NOTE — PROGRESS NOTES
"SUBJECTIVE  Subjective changes as noted by pt: Patient reports the first week after his visit the jaw felt kind of \"stiff\". He notes that he wakes clenching his teeth at night. Work days have been a bit less stressful and he has been working on not clenching. Other exercises went well. The controlled opening felt \"weird\" because it feels uncomfortable and like he hits a hard \"stop\". Hasn't had much pain this week except for chewing sometimes. No pain at rest.    Current pain level: 0/10   Changes in function:  Yes (See Goal flowsheet attached for changes in current functional level)     Adverse reaction to treatment or activity:  None    OBJECTIVE  Changes in objective findings: TMJ opening 38 mm with small click in early opening, small S curve deviation mid range, jaw end slightly right. Laterotrusion R 10 mm, L 7 mm. Improved mechanics vs initial visit. Joint mobility normal bilat. TTP R subocc, masseter, lateral pterygoid,     ASSESSMENT  Chris continues to require intervention to meet STG and LTG's: PT  Patient is progressing as expected.  Response to therapy has shown an improvement in  pain level and ROM   Progress made towards STG/LTG?  Yes (See Goal flowsheet attached for updates on achievement of STG and LTG)    PLAN  Current treatment program is being advanced to more complex exercises.    PTA/ATC plan:  N/A    Please refer to the daily flowsheet for treatment today, total treatment time and time spent performing 1:1 timed codes.      "

## 2021-01-26 ENCOUNTER — THERAPY VISIT (OUTPATIENT)
Dept: PHYSICAL THERAPY | Facility: CLINIC | Age: 21
End: 2021-01-26
Payer: COMMERCIAL

## 2021-01-26 DIAGNOSIS — M26.609 TMJ (TEMPOROMANDIBULAR JOINT SYNDROME): ICD-10-CM

## 2021-01-26 PROCEDURE — 97110 THERAPEUTIC EXERCISES: CPT | Mod: GP | Performed by: PHYSICAL THERAPIST

## 2021-01-26 PROCEDURE — 97140 MANUAL THERAPY 1/> REGIONS: CPT | Mod: GP | Performed by: PHYSICAL THERAPIST

## 2021-01-26 PROCEDURE — 97530 THERAPEUTIC ACTIVITIES: CPT | Mod: GP | Performed by: PHYSICAL THERAPIST

## 2021-01-26 NOTE — PROGRESS NOTES
SUBJECTIVE  Subjective changes as noted by pt: Patient reports no additional soreness after manual therapy last visit. He feels like his jaw is feeling looser with the more advanced exercises. The end of last week and beginning of this week were very stressful and that meant more clenching. Able to open farther without pain but there is still clicking. Feels the manual therapy was somewhat helpful.    Current pain level: 0/10   Changes in function:  Yes (See Goal flowsheet attached for changes in current functional level)     Adverse reaction to treatment or activity:  None    OBJECTIVE  Changes in objective findings: TMJ opening 50 mm with R click and R deviation after 35 mm, mild pain on full opening. Laterotrusion 10 mm bilat no pain.      ASSESSMENT  Chris continues to require intervention to meet STG and LTG's: PT  Patient is progressing as expected.  Response to therapy has shown an improvement in  pain level and ROM   Progress made towards STG/LTG?  Yes (See Goal flowsheet attached for updates on achievement of STG and LTG)    PLAN  Current treatment program is being advanced to more complex exercises.    PTA/ATC plan:  N/A    Please refer to the daily flowsheet for treatment today, total treatment time and time spent performing 1:1 timed codes.

## 2021-02-10 ENCOUNTER — THERAPY VISIT (OUTPATIENT)
Dept: PHYSICAL THERAPY | Facility: CLINIC | Age: 21
End: 2021-02-10
Payer: COMMERCIAL

## 2021-02-10 DIAGNOSIS — M26.609 TMJ (TEMPOROMANDIBULAR JOINT SYNDROME): ICD-10-CM

## 2021-02-10 PROCEDURE — 97110 THERAPEUTIC EXERCISES: CPT | Mod: GP | Performed by: PHYSICAL THERAPIST

## 2021-02-10 PROCEDURE — 97530 THERAPEUTIC ACTIVITIES: CPT | Mod: GP | Performed by: PHYSICAL THERAPIST

## 2021-02-10 NOTE — PROGRESS NOTES
DISCHARGE REPORT    Progress reporting period is from 1/5/21 to 2/10/21.       SUBJECTIVE  Subjective changes noted by patient: Patient returns after a 2 week hiatus from PT. Patient reports his symptoms are about the same over the last 2 weeks. Notes that his bottom teeth are achy since the TMJ problem started, generally getting better but still there. Stress at work has been lower this week and clenching has been better. Overall, feels about 90% better since onset of therapy. The remaining 10% is related to clicking and tooth aching.     Current pain level is 0/10.     Previous pain level was 10/10.   Changes in function:  Yes (See Goal flowsheet attached for changes in current functional level)  Adverse reaction to treatment or activity: None    OBJECTIVE  Changes noted in objective findings: TMJ opening 50 mm with R click, mild deviation to R beyond 45 mm. Laterotrusion R 15mm, L 10 mm. L laterotrusion 12 mm end of treatment.      ASSESSMENT/PLAN  Updated problem list and treatment plan: subacute TMD - disc displacement with reduction    Pain -   home program  Decreased ROM/flexibility - home program  Impaired muscle performance -home program  Impaired posture - home program  STG/LTGs have been met or progress has been made towards goals:  Yes (See Goal flow sheet completed today.)  Assessment of Progress: The patient's condition is improving.  The patient's condition has potential to improve.  Self Management Plans:  Patient has been instructed in a home treatment program.  I have re-evaluated this patient and find that the nature, scope, duration and intensity of the therapy is appropriate for the medical condition of the patient.  Chris continues to require the following intervention to meet STG and LTG's:  PT intervention is no longer required to meet STG/LTG.    Recommendations:  This patient is ready to be discharged from therapy and continue their home treatment program.    Please refer to the daily  flowsheet for treatment today, total treatment time and time spent performing 1:1 timed codes.

## 2022-03-29 ENCOUNTER — OFFICE VISIT (OUTPATIENT)
Dept: OPTOMETRY | Facility: CLINIC | Age: 22
End: 2022-03-29
Payer: COMMERCIAL

## 2022-03-29 DIAGNOSIS — H04.123 TEAR FILM INSUFFICIENCY, BILATERAL: ICD-10-CM

## 2022-03-29 DIAGNOSIS — H52.13 MYOPIA OF BOTH EYES: Primary | ICD-10-CM

## 2022-03-29 PROCEDURE — 92014 COMPRE OPH EXAM EST PT 1/>: CPT | Performed by: OPTOMETRIST

## 2022-03-29 PROCEDURE — 92015 DETERMINE REFRACTIVE STATE: CPT | Performed by: OPTOMETRIST

## 2022-03-29 ASSESSMENT — VISUAL ACUITY
CORRECTION_TYPE: GLASSES
OS_CC: 20/20-1
OS_CC: 20/20
OS_CC+: -1
OD_CC: 20/20-1
METHOD: SNELLEN - LINEAR
OD_CC: 20/20

## 2022-03-29 ASSESSMENT — CUP TO DISC RATIO
OD_RATIO: 0.4
OS_RATIO: 0.4

## 2022-03-29 ASSESSMENT — REFRACTION_WEARINGRX
OS_CYLINDER: +0.50
OS_AXIS: 087
OS_SPHERE: -4.25
SPECS_TYPE: SVL
OD_CYLINDER: SPHERE
OD_SPHERE: -4.00

## 2022-03-29 ASSESSMENT — TONOMETRY
OD_IOP_MMHG: 15
OS_IOP_MMHG: 15
IOP_METHOD: APPLANATION

## 2022-03-29 ASSESSMENT — CONF VISUAL FIELD
METHOD: COUNTING FINGERS
OD_NORMAL: 1
OS_NORMAL: 1

## 2022-03-29 ASSESSMENT — REFRACTION_MANIFEST
OD_SPHERE: -4.25
OD_CYLINDER: SPHERE
OD_SPHERE: -4.25
OS_CYLINDER: SPHERE
OS_SPHERE: -4.00
OS_SPHERE: -4.25
METHOD_AUTOREFRACTION: 1

## 2022-03-29 ASSESSMENT — KERATOMETRY
OS_AXISANGLE2_DEGREES: 4
OD_AXISANGLE2_DEGREES: 3
OS_K2POWER_DIOPTERS: 43.50
OD_K1POWER_DIOPTERS: 42.75
OS_K1POWER_DIOPTERS: 42.75
OD_K2POWER_DIOPTERS: 43.25

## 2022-03-29 ASSESSMENT — EXTERNAL EXAM - LEFT EYE: OS_EXAM: NORMAL

## 2022-03-29 ASSESSMENT — EXTERNAL EXAM - RIGHT EYE: OD_EXAM: NORMAL

## 2022-03-29 ASSESSMENT — SLIT LAMP EXAM - LIDS: COMMENTS: NORMAL

## 2022-03-29 NOTE — PROGRESS NOTES
Chief Complaint   Patient presents with     COMPREHENSIVE EYE EXAM      Accompanied by mother    Last Eye Exam: 8/26/2019  Dilated Previously: Yes    What are you currently using to see?  glasses       Distance Vision Acuity: Noticed gradual change in both eyes, and seems worse at night -  Sees better if he tips chin up and look down while driving after long work day , also while riding mountain bike     Near Vision Acuity: Satisfied with vision while reading and using computer with glasses    Eye Comfort: good, does have some dry eye sometimes at the end of the day   Do you use eye drops? 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       OBJECTIVE: See Ophthalmology exam    ASSESSMENT:    ICD-10-CM    1. Myopia of both eyes  H52.13 EYE EXAM (SIMPLE-NONBILLABLE)     REFRACTION   2. Tear film insufficiency, bilateral  H04.123        PLAN:     Patient Instructions   Fill glasses prescription  Allow 2 weeks to adapt to change in glasses  Use over the counter artificial tears 3 times a day    Anti burn or sting - Thera Tears Extra, Refresh Digital*,   Systane Complete )  These are oil based.  (Soothe XP-Xtra Protection (white liquid), Systane Balance  (white liquid) or Retaine ).      Return in 1 -2 year for eye exam    Neda Hamilton O.D.  [facility]  [address] [place], [place] [id number] [phone]

## 2022-03-29 NOTE — LETTER
3/29/2022         RE: Chris Bartholomew  [id number] [facility]  [place] [address]        Dear Colleague,    Thank you for referring your patient, Chris Bartholomew, to the [facility]. Please see a copy of my visit note below.    Chief Complaint   Patient presents with     COMPREHENSIVE EYE EXAM      Accompanied by mother    Last Eye Exam: 8/26/2019  Dilated Previously: Yes    What are you currently using to see?  glasses       Distance Vision Acuity: Noticed gradual change in both eyes, and seems worse at night - helps to tip chin up and look down while driving after long work day      Will riding mt bike has notice it too     Near Vision Acuity: Satisfied with vision while reading and using computer with glasses    Eye Comfort: good, does have some dry eye sometimes at the end of the day   Do you use eye drops? 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       OBJECTIVE: See Ophthalmology exam    ASSESSMENT:    ICD-10-CM    1. Myopia of both eyes  H52.13 EYE EXAM (SIMPLE-NONBILLABLE)     REFRACTION   2. Tear film insufficiency, bilateral  H04.123        PLAN:     Patient Instructions   Fill glasses prescription  Allow 2 weeks to adapt to change in glasses  Use over the counter artificial tears 3 times a day    Anti burn or sting - Thera Tears Extra, Refresh Digital*,   Systane Complete )  These are oil based.  (Soothe XP-Xtra Protection (white liquid), Systane Balance  (white liquid) or Retaine ).      Return in 1 -2 year for eye exam    Neda Hamilton O.D.  [facility]  [address] [place] [address] [phone]           Again, thank you for allowing me to participate in the care of your patient.        Sincerely,        Neda Hamilton, OD

## 2022-03-29 NOTE — PATIENT INSTRUCTIONS
Fill glasses prescription  Allow 2 weeks to adapt to change in glasses  Use over the counter artificial tears 3 times a day    Anti burn or sting - Thera Tears Extra, Refresh Digital*,   Systane Complete )  These are oil based.  (Soothe XP-Xtra Protection (white liquid), Systane Balance  (white liquid) or Retaine ).      Return in 1 -2 year for eye exam    Neda Hamilton O.D.  [facility]  [address] [place] [address] [phone]

## 2023-04-19 ENCOUNTER — OFFICE VISIT (OUTPATIENT)
Dept: OPTOMETRY | Facility: CLINIC | Age: 23
End: 2023-04-19
Payer: COMMERCIAL

## 2023-04-19 DIAGNOSIS — H52.13 MYOPIA OF BOTH EYES: ICD-10-CM

## 2023-04-19 DIAGNOSIS — Z01.00 ROUTINE EYE EXAM: Primary | ICD-10-CM

## 2023-04-19 PROCEDURE — 92015 DETERMINE REFRACTIVE STATE: CPT | Performed by: OPTOMETRIST

## 2023-04-19 PROCEDURE — 92014 COMPRE OPH EXAM EST PT 1/>: CPT | Performed by: OPTOMETRIST

## 2023-04-19 ASSESSMENT — REFRACTION_MANIFEST
OD_SPHERE: -4.50
OD_CYLINDER: SPHERE
OS_SPHERE: -4.50
OD_CYLINDER: SPHERE
OS_SPHERE: -4.25
OD_SPHERE: -4.25
OS_CYLINDER: SPHERE
OS_CYLINDER: SPHERE

## 2023-04-19 ASSESSMENT — VISUAL ACUITY
CORRECTION_TYPE: GLASSES
OS_CC: J1+
OS_CC: 20/20
METHOD: SNELLEN - LINEAR
OD_CC: J1+
OD_CC: 20/20
OS_CC+: -1

## 2023-04-19 ASSESSMENT — KERATOMETRY
OD_AXISANGLE_DEGREES: 098
OS_K2POWER_DIOPTERS: 43.50
OD_AXISANGLE2_DEGREES: 008
OS_K1POWER_DIOPTERS: 43.00
OD_K2POWER_DIOPTERS: 43.25
OS_AXISANGLE_DEGREES: 096
OD_K1POWER_DIOPTERS: 42.75
OS_AXISANGLE2_DEGREES: 006

## 2023-04-19 ASSESSMENT — TONOMETRY
OS_IOP_MMHG: 18
IOP_METHOD: APPLANATION
OD_IOP_MMHG: 18

## 2023-04-19 ASSESSMENT — CONF VISUAL FIELD
OS_NORMAL: 1
OD_SUPERIOR_TEMPORAL_RESTRICTION: 0
METHOD: COUNTING FINGERS
OS_SUPERIOR_TEMPORAL_RESTRICTION: 0
OD_INFERIOR_NASAL_RESTRICTION: 0
OD_INFERIOR_TEMPORAL_RESTRICTION: 0
OS_INFERIOR_NASAL_RESTRICTION: 0
OS_INFERIOR_TEMPORAL_RESTRICTION: 0
OD_NORMAL: 1
OD_SUPERIOR_NASAL_RESTRICTION: 0
OS_SUPERIOR_NASAL_RESTRICTION: 0

## 2023-04-19 ASSESSMENT — EXTERNAL EXAM - RIGHT EYE: OD_EXAM: NORMAL

## 2023-04-19 ASSESSMENT — REFRACTION_WEARINGRX
OS_SPHERE: -4.25
OS_CYLINDER: +0.50
OD_CYLINDER: SPHERE
OD_SPHERE: -4.00
OS_AXIS: 087
SPECS_TYPE: SVL

## 2023-04-19 ASSESSMENT — EXTERNAL EXAM - LEFT EYE: OS_EXAM: NORMAL

## 2023-04-19 ASSESSMENT — SLIT LAMP EXAM - LIDS: COMMENTS: NORMAL

## 2023-04-19 ASSESSMENT — CUP TO DISC RATIO
OD_RATIO: 0.4
OS_RATIO: 0.4

## 2023-04-19 NOTE — PATIENT INSTRUCTIONS
Fill glasses prescription    Return in 1 -2 years for eye exam    Neda Hamilton O.D.  [facility]  [address] [place] [address] [phone]

## 2023-04-19 NOTE — LETTER
4/19/2023         RE: Chris Bartholomew  [id number]  [place] [address]        Dear Colleague,    Thank you for referring your patient, Chris Bartholomew, to the [facility]. Please see a copy of my visit note below.    Chief Complaint   Patient presents with     Annual Eye Exam         Last Eye Exam: March 2022  Dilated Previously: Yes, side effects of dilation explained today    What are you currently using to see?  glasses       Distance Vision Acuity: Satisfied with vision    Near Vision Acuity: Satisfied with vision while reading  with glasses    Eye Comfort: good and dry due to more screen time on computer   Do you use eye drops? : Yes: otc eye drops - seem to help but only for a short amount of time.   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       OBJECTIVE: See Ophthalmology exam    ASSESSMENT:    ICD-10-CM    1. Routine eye exam  Z01.00 EYE EXAM (SIMPLE-NONBILLABLE)     REFRACTION      2. Myopia of both eyes  H52.13 EYE EXAM (SIMPLE-NONBILLABLE)     REFRACTION          PLAN:     Patient Instructions   Fill glasses prescription    Return in 1 -2 years for eye exam    Neda Hamitlon O.D.  [facility]  [address] [place] [address] [phone]            Again, thank you for allowing me to participate in the care of your patient.        Sincerely,        Neda Hamilton, OD

## 2023-04-19 NOTE — PROGRESS NOTES
Chief Complaint   Patient presents with     Annual Eye Exam         Last Eye Exam: March 2022  Dilated Previously: Yes, side effects of dilation explained today    What are you currently using to see?  glasses       Distance Vision Acuity: Satisfied with vision    Near Vision Acuity: Satisfied with vision while reading  with glasses    Eye Comfort: good and dry due to more screen time on computer   Do you use eye drops? : Yes: otc eye drops - seem to help but only for a short amount of time.   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       OBJECTIVE: See Ophthalmology exam    ASSESSMENT:    ICD-10-CM    1. Routine eye exam  Z01.00 EYE EXAM (SIMPLE-NONBILLABLE)     REFRACTION      2. Myopia of both eyes  H52.13 EYE EXAM (SIMPLE-NONBILLABLE)     REFRACTION          PLAN:     Patient Instructions   Fill glasses prescription    Return in 1 -2 years for eye exam    Neda Hamilton O.D.  [facility]  [address], [place] [id number] [phone]